# Patient Record
Sex: MALE | Race: WHITE | NOT HISPANIC OR LATINO | URBAN - METROPOLITAN AREA
[De-identification: names, ages, dates, MRNs, and addresses within clinical notes are randomized per-mention and may not be internally consistent; named-entity substitution may affect disease eponyms.]

---

## 2018-01-09 ENCOUNTER — OFFICE VISIT (OUTPATIENT)
Dept: FAMILY MEDICINE | Facility: CLINIC | Age: 48
End: 2018-01-09
Payer: COMMERCIAL

## 2018-01-09 VITALS
WEIGHT: 216 LBS | HEART RATE: 85 BPM | SYSTOLIC BLOOD PRESSURE: 122 MMHG | DIASTOLIC BLOOD PRESSURE: 74 MMHG | OXYGEN SATURATION: 97 % | BODY MASS INDEX: 30.24 KG/M2 | HEIGHT: 71 IN | TEMPERATURE: 98 F

## 2018-01-09 DIAGNOSIS — Z23 NEED FOR TETANUS BOOSTER: ICD-10-CM

## 2018-01-09 DIAGNOSIS — Z87.828 HISTORY OF DISRUPTION OF MEDIAL COLLATERAL LIGAMENT: ICD-10-CM

## 2018-01-09 DIAGNOSIS — S89.92XA KNEE INJURY, LEFT, INITIAL ENCOUNTER: Primary | ICD-10-CM

## 2018-01-09 PROCEDURE — 99213 OFFICE O/P EST LOW 20 MIN: CPT | Performed by: PHYSICIAN ASSISTANT

## 2018-01-09 NOTE — MR AVS SNAPSHOT
After Visit Summary   1/9/2018    Sergio Asencio Jr    MRN: [id number]           Patient Information     Date Of Birth          1970        Visit Information        Provider Department      1/9/2018 8:20 AM Torkilsen, Ro Rox, PA-C [facility]        Today's Diagnoses     Knee injury, left, initial encounter    -  1    History of disruption of medial collateral ligament          Care Instructions    History most suggestive of MCL sprain.  Unclear if you have a component of meniscal injury as well.  Given very active and prior hx of previous MCL sprain on same side will refer to ortho for further eval.  Rest, ice, brace and NSAIDs as needed in the meantime.    Electronically Signed By: Ro Hightower PA-C            Follow-ups after your visit        Additional Services     ORTHOPEDICS ADULT REFERRAL       Your provider has referred you to: [facility] [place] [phone]   [email or web address]  [email or web address] [phone]   [email or web address]    Please be aware that coverage of these services is subject to the terms and limitations of your health insurance plan.  Call member services at your health plan with any benefit or coverage questions.      Please bring the following to your appointment:    >>   Any x-rays, CTs or MRIs which have been performed.  Contact the facility where they were done to arrange for  prior to your scheduled appointment.    >>   List of current medications   >>   This referral request   >>   Any documents/labs given to you for this referral                  Who to contact     If you have questions or need follow up information about today's clinic visit or your schedule please contact [facility] directly at [phone].  Normal or non-critical lab and imaging results will be communicated to you by MyChart, letter or phone within 4 business days after the clinic has received  "the results. If you do not hear from us within 7 days, please contact the clinic through [employer] or phone. If you have a critical or abnormal lab result, we will notify you by phone as soon as possible.  Submit refill requests through [employer] or call your pharmacy and they will forward the refill request to us. Please allow 3 business days for your refill to be completed.          Additional Information About Your Visit        [employer] Information     [employer] gives you secure access to your electronic health record. If you see a primary care provider, you can also send messages to your care team and make appointments. If you have questions, please call your primary care clinic.  If you do not have a primary care provider, please call [phone] and they will assist you.        Care EveryWhere ID     This is your Care EveryWhere ID. This could be used by other organizations to access your [place] medical records  [id number]        Your Vitals Were     Pulse Temperature Height Pulse Oximetry BMI (Body Mass Index)       85 98  F (36.7  C) (Oral) 5' 11\" (1.803 m) 97% 30.13 kg/m2        Blood Pressure from Last 3 Encounters:   01/09/18 122/74   06/13/16 128/84   05/23/16 112/82    Weight from Last 3 Encounters:   01/09/18 216 lb (98 kg)   06/13/16 214 lb (97.1 kg)   05/23/16 214 lb (97.1 kg)              We Performed the Following     ORTHOPEDICS ADULT REFERRAL          Today's Medication Changes          These changes are accurate as of: 1/9/18  8:42 AM.  If you have any questions, ask your nurse or doctor.               Stop taking these medicines if you haven't already. Please contact your care team if you have questions.     IBUPROFEN PO   Stopped by:  Ro Hightower PA-C           NO ACTIVE MEDICATIONS   Stopped by:  Ro Hightower PA-C           order for DME   Stopped by:  Ro Hightower PA-C                    Primary Care Provider Office Phone # Fax #    Jame Farrukh " MD Luis M [phone]       [address]  [facility] [address]        Equal Access to Services     BRIAN LI : Alise aad ku hadjimcandace Emelylester, aideallison thakkarclaraha, llye kajenada omayra, radha javonin hayaan maddyvinicio sanders laortizedd jaime. So [facility] [phone].    ATENCIÓN: Si habla español, tiene a vinson disposición servicios gratuitos de asistencia lingüística. Llame al [phone].    We comply with applicable federal civil rights laws and [place] laws. We do not discriminate on the basis of race, color, national origin, age, disability, sex, sexual orientation, or gender identity.            Thank you!     Thank you for choosing [facility]  for your care. Our goal is always to provide you with excellent care. Hearing back from our patients is one way we can continue to improve our services. Please take a few minutes to complete the written survey that you may receive in the mail after your visit with us. Thank you!             Your Updated Medication List - Protect others around you: Learn how to safely use, store and throw away your medicines at www.disposemymeds.org.      Notice  As of 1/9/2018  8:42 AM    You have not been prescribed any medications.

## 2018-01-09 NOTE — PROGRESS NOTES
SUBJECTIVE:   Sergio Asencio Jr is a 47 year old male who presents to clinic today for the following health issues:      Joint Pain    Onset: Sunday night - playing hockey; was just moving back and forth in the crease going side to side when he abducted leg and medial stress to L knee and felt a pop sensation along medial knee. No real swelling.  Rates pain at rest as 1/10. If strains medial knee will increase to 6-7/10 and will catch.     No continued popping or locking.     Reports has has torn his MCL on this side in his mid 20's.     Didn't notice at first after injury, but yesterday and today has noticed more with walking.     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     Therapies Tried and outcome: Used ice immediately when he got home so it isn't to swollen  No OTC pain relievers.  Has braces at home one from [employer] and one from ortho from his last surgery - hasn't used at all.   Surghx: meniscal tear L completed by [facility].     Problem list and histories reviewed & adjusted, as indicated.  Additional history: as documented    Patient Active Problem List   Diagnosis     Contusion, finger     CARDIOVASCULAR SCREENING; LDL GOAL LESS THAN 160     Past Surgical History:   Procedure Laterality Date     ARTHROSCOPY KNEE RT/LT  3/2012    Rt. Knee Meniscal Tear       Social History   Substance Use Topics     Smoking status: Never Smoker     Smokeless tobacco: Former User     Alcohol use 2.5 - 3.0 oz/week     5 - 6 drink(s) per week      "Family History   Problem Relation Age of Onset     DIABETES Mother      Neurologic Disorder Son      Asthma Son          No current outpatient prescriptions on file.     Allergies   Allergen Reactions     Penicillins          Reviewed and updated as needed this visit by clinical staffTobacco  Allergies  Meds  Med Hx  Surg Hx  Fam Hx  Soc Hx      Reviewed and updated as needed this visit by Provider  Tobacco  Allergies  Meds  Med Hx  Surg Hx  Fam Hx  Soc Hx        ROS:  Constitutional, MSK systems are negative, except as otherwise noted.      OBJECTIVE:   /74  Pulse 85  Temp 98  F (36.7  C) (Oral)  Ht 5' 11\" (1.803 m)  Wt 216 lb (98 kg)  SpO2 97%  BMI 30.13 kg/m2  Body mass index is 30.13 kg/(m^2).  GENERAL: healthy, alert and no distress  MS: ambulates with non-antalgic gait. FROM with L knee extension/flexion. No appreciable swelling or joint effusion. No redness, ecchymosis or warmth. Is TTP along L medial joint line, but especially over MCL. Pain reproduced with medial stress testing, but no significant laxity when compared to R knee. Negative anterior drawer. Mild discomfort medially with Ester testing, but negative thessally testing.     Diagnostic Test Results:  none     ASSESSMENT/PLAN:       ICD-10-CM    1. Knee injury, left, initial encounter S89.92XA ORTHOPEDICS ADULT REFERRAL   2. History of disruption of medial collateral ligament Z87.39 ORTHOPEDICS ADULT REFERRAL   3. Need for tetanus booster - believes he had this a couple yrs ago. Will check records and bring copy for us. Z23    Pt requests to see [facility] as worked with them with prior knee injuries/R knee surgery.  See Patient Instructions  Pt in agreement with plan.   Patient Instructions   History most suggestive of MCL sprain.  Unclear if you have a component of meniscal injury as well.  Given very active and prior hx of previous MCL sprain on same side will refer to ortho for further eval.  Rest, ice, brace and NSAIDs as " needed in the meantime.    Electronically Signed By: Ro Hightower PA-C

## 2018-01-09 NOTE — NURSING NOTE
"Chief Complaint   Patient presents with     Knee Pain       Initial /74  Pulse 85  Temp 98  F (36.7  C) (Oral)  Ht 5' 11\" (1.803 m)  Wt 216 lb (98 kg)  SpO2 97%  BMI 30.13 kg/m2 Estimated body mass index is 30.13 kg/(m^2) as calculated from the following:    Height as of this encounter: 5' 11\" (1.803 m).    Weight as of this encounter: 216 lb (98 kg).  Medication Reconciliation: complete    "

## 2018-01-09 NOTE — PATIENT INSTRUCTIONS
History most suggestive of MCL sprain.  Unclear if you have a component of meniscal injury as well.  Given very active and prior hx of previous MCL sprain on same side will refer to ortho for further eval.  Rest, ice, brace and NSAIDs as needed in the meantime.    Electronically Signed By: Ro Hightower PA-C

## 2019-07-17 ENCOUNTER — TRANSFERRED RECORDS (OUTPATIENT)
Dept: HEALTH INFORMATION MANAGEMENT | Facility: CLINIC | Age: 49
End: 2019-07-17

## 2019-08-01 ENCOUNTER — TRANSFERRED RECORDS (OUTPATIENT)
Dept: HEALTH INFORMATION MANAGEMENT | Facility: CLINIC | Age: 49
End: 2019-08-01

## 2019-10-01 ENCOUNTER — HEALTH MAINTENANCE LETTER (OUTPATIENT)
Age: 49
End: 2019-10-01

## 2021-01-15 ENCOUNTER — HEALTH MAINTENANCE LETTER (OUTPATIENT)
Age: 51
End: 2021-01-15

## 2021-08-23 ENCOUNTER — VIRTUAL VISIT (OUTPATIENT)
Dept: FAMILY MEDICINE | Facility: CLINIC | Age: 51
End: 2021-08-23

## 2021-08-23 DIAGNOSIS — U07.1 CLINICAL DIAGNOSIS OF COVID-19: Primary | ICD-10-CM

## 2021-08-23 PROCEDURE — 99203 OFFICE O/P NEW LOW 30 MIN: CPT | Mod: GT | Performed by: NURSE PRACTITIONER

## 2021-08-23 RX ORDER — OMEGA-3 FATTY ACIDS/FISH OIL 300-1000MG
200 CAPSULE ORAL EVERY 4 HOURS PRN
COMMUNITY

## 2021-08-23 NOTE — PROGRESS NOTES
Problem(s) Oriented visit      Phone-Visit Details    Type of service:  Phone Visit    Phone Start Time (time phone started): 0959    Phone End Time (time phone stopped): 1008    Originating Location (pt. Location): Home    Distant Location (provider location):  [facility]     Mode of Communication: Phone conference    Physician has received verbal consent for a Phone Visit from the patient? Yes    This was a virtual phone visit conducted during COVID-19 outbreak in regulation with social distancing and quarantine recommendations of the CDC and [place] department of health and human services. A two way audio/video connection was used in real time with patient's consent.    FERNANDO Blum CNP    SUBJECTIVE:                                                    Sergio Asencio Jr is a 51 year old male who presents to clinic today for the following health issues :    Symptoms seemed like allergies on Saturday. Was in [place] the week before. Wife encouraged him to get tested for Covid-19. Had positive antigen test yesterday. Symptoms mild and wondering what to do now. Fully vaccinated with Pfizer as of 4/27/2021. Lives with wife, son and other son who comes and goes. All of them are vaccinated with no symptoms. Patient staying in basement and wearing mask when he goes upstairs around family. Denies fever, chills, shortness of breath.    Problem list, Medication list, Allergies, and Medical/Social/Surgical histories reviewed in [facility] and updated as appropriate.   Additional history: as documented    ROS:  5 point ROS completed and negative except noted above, including Gen, CV, Resp, GI, MS    OBJECTIVE:                                                    No vitals taken due to telehealth visit    General: Sounds to be in no distress  Resp: speaking in full sentences, no cough  Psych: normal mood     ASSESSMENT/PLAN:                                                      Sergio was seen today for suspected  "covid.    Diagnoses and all orders for this visit:    Clinical diagnosis of COVID-19    Discussed recommendations for quarantine and safety after being diagnosed with Covid-19. Recommend 10 day quarantine. Family should be tested 3-5 days after exposure.    See Patient Instructions  Patient Instructions   Discharge Instructions for COVID-19 Patients  You have--or may have--COVID-19. Please follow the instructions listed below.   If you have a weakened immune system, discuss with your doctor any other actions you need to take.  How can I protect others?  If you have symptoms (fever, cough, body aches or trouble breathing):    Stay home and away from others (self-isolate) until:  ? Your other symptoms have resolved (gotten better). And   ? You've had no fever--and no medicine that reduces fever--for 1 full day (24 hours). And   ? At least 10 days have passed since your symptoms started. (You may need to wait 20 days. Follow the advice of your care team.)  If you don't show symptoms, but testing showed that you have COVID-19:    Stay home and away from others (self-isolate) until at least 10 days have passed since the date of your first positive COVID-19 test.  During this time    Stay in your own room, even for meals. Use your own bathroom if you can.    Stay away from others in your home. No hugging, kissing or shaking hands. No visitors.    Don't go to work, school or anywhere else.    Clean \"high touch\" surfaces often (doorknobs, counters, handles). Use household cleaning spray or wipes.    You'll find a full list of  on the EPA website: www.epa.gov/pesticide-registration/list-n-disinfectants-use-against-sars-cov-2.    Cover your mouth and nose with a mask or other face covering to avoid spreading germs.    Wash your hands and face often. Use soap and water.    Caregivers in these groups are at risk for severe illness due to COVID-19:  ? People 65 years and older  ? People who live in a nursing home or " long-term care facility  ? People with chronic disease (lung, heart, cancer, diabetes, kidney, liver, immunologic)  ? People who have a weakened immune system, including those who:    Are in cancer treatment    Take medicine that weakens the immune system, such as corticosteroids    Had a bone marrow or organ transplant    Have an immune deficiency    Have poorly controlled HIV or AIDS    Are obese (body mass index of 40 or higher)    Smoke regularly    Caregivers should wear gloves while washing dishes, handling laundry and cleaning bedrooms and bathrooms.    Use caution when washing and drying laundry: Don't shake dirty laundry and use the warmest water setting that you can.    For more tips on managing your health at home, go to www.cdc.gov/coronavirus/2019-ncov/downloads/10Things.pdf.  How can I take care of myself at home?  1. Get lots of rest. Drink extra fluids (unless a doctor has told you not to).  2. Take Tylenol (acetaminophen) for fever or pain. If you have liver or kidney problems, ask your family doctor if it's okay to take Tylenol.   Adults can take either:   ? 650 mg (two 325 mg pills) every 4 to 6 hours, or   ? 1,000 mg (two 500 mg pills) every 8 hours as needed.  ? Note: Don't take more than 3,000 mg in one day. Acetaminophen is found in many medicines (both prescribed and over-the-counter medicines). Read all labels to be sure you don't take too much.   For children, check the Tylenol bottle for the right dose. The dose is based on the child's age or weight.  3. If you have other health problems (like cancer, heart failure, an organ transplant or severe kidney disease): Call your specialty clinic if you don't feel better in the next 2 days.  4. Know when to call 911. Emergency warning signs include:  ? Trouble breathing or shortness of breath  ? Pain or pressure in the chest that doesn't go away  ? Feeling confused like you haven't felt before, or not being able to wake up  ? Bluish-colored lips  or face  5. Your doctor may have prescribed a blood thinner medicine. Follow their instructions.  Where can I get more information?    [facility] - About COVID-19:   [email or web address]    CDC - What to Do If You're Sick: www.cdc.gov/coronavirus/2019-ncov/about/steps-when-sick.html    CDC - Ending Home Isolation: www.cdc.gov/coronavirus/2019-ncov/hcp/disposition-in-home-patients.html    CDC - Caring for Someone: www.cdc.gov/coronavirus/2019-ncov/if-you-are-sick/care-for-someone.html    [facility] - Interim Guidance for Hospital Discharge to Home: www.[facility].[place]/diseases/coronavirus/hcp/hospdischarge.pdf    Below are the COVID-19 hotlines at the [place] Department of Health ([facility]). Interpreters are available.  ? For health questions: Call 108-223-2178 or 1-495.982.8282 (7 a.m. to 7 p.m.)  ? For questions about schools and childcare: Call 365-510-8472 or 1-637.424.8502 (7 a.m. to 7 p.m.)    For informational purposes only. Not to replace the advice of your health care provider. Clinically reviewed by Dr. Mj Eaton.   Copyright   2020 [facility]. All rights reserved. [employer] 924934 - REV 01/05/21.                 FERNANDO Blum [facility]  Family Practice  [facility]  [phone]    For any issues my office # is [phone]

## 2021-08-23 NOTE — PATIENT INSTRUCTIONS
"Discharge Instructions for COVID-19 Patients  You have--or may have--COVID-19. Please follow the instructions listed below.   If you have a weakened immune system, discuss with your doctor any other actions you need to take.  How can I protect others?  If you have symptoms (fever, cough, body aches or trouble breathing):    Stay home and away from others (self-isolate) until:  ? Your other symptoms have resolved (gotten better). And   ? You've had no fever--and no medicine that reduces fever--for 1 full day (24 hours). And   ? At least 10 days have passed since your symptoms started. (You may need to wait 20 days. Follow the advice of your care team.)  If you don't show symptoms, but testing showed that you have COVID-19:    Stay home and away from others (self-isolate) until at least 10 days have passed since the date of your first positive COVID-19 test.  During this time    Stay in your own room, even for meals. Use your own bathroom if you can.    Stay away from others in your home. No hugging, kissing or shaking hands. No visitors.    Don't go to work, school or anywhere else.    Clean \"high touch\" surfaces often (doorknobs, counters, handles). Use household cleaning spray or wipes.    You'll find a full list of  on the EPA website: www.epa.gov/pesticide-registration/list-n-disinfectants-use-against-sars-cov-2.    Cover your mouth and nose with a mask or other face covering to avoid spreading germs.    Wash your hands and face often. Use soap and water.    Caregivers in these groups are at risk for severe illness due to COVID-19:  ? People 65 years and older  ? People who live in a nursing home or long-term care facility  ? People with chronic disease (lung, heart, cancer, diabetes, kidney, liver, immunologic)  ? People who have a weakened immune system, including those who:    Are in cancer treatment    Take medicine that weakens the immune system, such as corticosteroids    Had a bone marrow or organ " transplant    Have an immune deficiency    Have poorly controlled HIV or AIDS    Are obese (body mass index of 40 or higher)    Smoke regularly    Caregivers should wear gloves while washing dishes, handling laundry and cleaning bedrooms and bathrooms.    Use caution when washing and drying laundry: Don't shake dirty laundry and use the warmest water setting that you can.    For more tips on managing your health at home, go to www.cdc.gov/coronavirus/2019-ncov/downloads/10Things.pdf.  How can I take care of myself at home?  1. Get lots of rest. Drink extra fluids (unless a doctor has told you not to).  2. Take Tylenol (acetaminophen) for fever or pain. If you have liver or kidney problems, ask your family doctor if it's okay to take Tylenol.   Adults can take either:   ? 650 mg (two 325 mg pills) every 4 to 6 hours, or   ? 1,000 mg (two 500 mg pills) every 8 hours as needed.  ? Note: Don't take more than 3,000 mg in one day. Acetaminophen is found in many medicines (both prescribed and over-the-counter medicines). Read all labels to be sure you don't take too much.   For children, check the Tylenol bottle for the right dose. The dose is based on the child's age or weight.  3. If you have other health problems (like cancer, heart failure, an organ transplant or severe kidney disease): Call your specialty clinic if you don't feel better in the next 2 days.  4. Know when to call 911. Emergency warning signs include:  ? Trouble breathing or shortness of breath  ? Pain or pressure in the chest that doesn't go away  ? Feeling confused like you haven't felt before, or not being able to wake up  ? Bluish-colored lips or face  5. Your doctor may have prescribed a blood thinner medicine. Follow their instructions.  Where can I get more information?     [facility] - About COVID-19:   [email or web address]    CDC - What to Do If You're Sick:  www.cdc.gov/coronavirus/2019-ncov/about/steps-when-sick.html    CDC - Ending Home Isolation: www.cdc.gov/coronavirus/2019-ncov/hcp/disposition-in-home-patients.html    CDC - Caring for Someone: www.cdc.gov/coronavirus/2019-ncov/if-you-are-sick/care-for-someone.html    [facility] - Interim Guidance for Hospital Discharge to Home: www.health.[facility].[place]./diseases/coronavirus/hcp/hospdischarge.pdf    Below are the COVID-19 hotlines at the [place] Department of Health ([facility]). Interpreters are available.  ? For health questions: Call 015-788-6803 or 1-166.610.5209 (7 a.m. to 7 p.m.)  ? For questions about schools and childcare: Call 954-464-9346 or 1-257.210.1925 (7 a.m. to 7 p.m.)    For informational purposes only. Not to replace the advice of your health care provider. Clinically reviewed by Dr. Mj Eaton.   Copyright   2020 [facility]. All rights reserved. [employer] 259693 - REV 01/05/21.

## 2021-09-04 ENCOUNTER — HEALTH MAINTENANCE LETTER (OUTPATIENT)
Age: 51
End: 2021-09-04

## 2022-02-19 ENCOUNTER — HEALTH MAINTENANCE LETTER (OUTPATIENT)
Age: 52
End: 2022-02-19

## 2022-10-16 ENCOUNTER — HEALTH MAINTENANCE LETTER (OUTPATIENT)
Age: 52
End: 2022-10-16

## 2023-04-01 ENCOUNTER — HEALTH MAINTENANCE LETTER (OUTPATIENT)
Age: 53
End: 2023-04-01

## 2024-06-01 ENCOUNTER — HEALTH MAINTENANCE LETTER (OUTPATIENT)
Age: 54
End: 2024-06-01